# Patient Record
Sex: MALE | Race: WHITE | URBAN - METROPOLITAN AREA
[De-identification: names, ages, dates, MRNs, and addresses within clinical notes are randomized per-mention and may not be internally consistent; named-entity substitution may affect disease eponyms.]

---

## 2024-07-11 ENCOUNTER — APPOINTMENT (OUTPATIENT)
Dept: GENERAL RADIOLOGY | Age: 29
End: 2024-07-11

## 2024-07-11 ENCOUNTER — HOSPITAL ENCOUNTER (EMERGENCY)
Age: 29
Discharge: HOME | End: 2024-07-12
Attending: EMERGENCY MEDICINE

## 2024-07-11 VITALS
DIASTOLIC BLOOD PRESSURE: 81 MMHG | BODY MASS INDEX: 22.82 KG/M2 | OXYGEN SATURATION: 97 % | RESPIRATION RATE: 16 BRPM | SYSTOLIC BLOOD PRESSURE: 117 MMHG | WEIGHT: 163 LBS | HEART RATE: 68 BPM | TEMPERATURE: 97.5 F | HEIGHT: 71 IN

## 2024-07-11 DIAGNOSIS — S93.601A SPRAIN OF RIGHT FOOT, INITIAL ENCOUNTER: Primary | ICD-10-CM

## 2024-07-11 PROCEDURE — 73630 X-RAY EXAM OF FOOT: CPT

## 2024-07-11 PROCEDURE — 99283 EMERGENCY DEPT VISIT LOW MDM: CPT

## 2024-07-11 ASSESSMENT — PAIN - FUNCTIONAL ASSESSMENT: PAIN_FUNCTIONAL_ASSESSMENT: 0-10

## 2024-07-11 ASSESSMENT — PAIN SCALES - GENERAL: PAINLEVEL_OUTOF10: 2

## 2024-07-12 NOTE — DISCHARGE INSTRUCTIONS
Call today or tomorrow to follow up with your doctor in 2-3 days.    Use an ice pack or bag filled with ice and apply to the injured area 3 - 4 times a day for 15 - 20 minutes each time. Elevate the foot as much as possible.     Use ibuprofen or Tylenol (unless prescribed medications that have Tylenol in it) for pain.  You can take over the counter Ibuprofen (advil) tablets (4 every 8 hours or 3 every 6 hours or 2 every 4 hours)    Use your boot for the next several days until you are able to take 10 steps without pain.    Return to the Emergency Department for worsening of pain, increase swelling to the ankle, inability to move your toes, any other care or concern.

## 2024-07-12 NOTE — ED PROVIDER NOTES
[facility] Emergency Department  [address]  [facility] [address]  Phone: [phone]  Fax: [phone]      Pt Name: Miguelito Peterson  MRN:[id number]  Birthdate 1995  Date of evaluation: 7/11/2024      CHIEF COMPLAINT       Chief Complaint   Patient presents with    Foot Pain     Patient c/o right foot pain d/t right foot injury. Patient states his lawn tractors tire was coming off d/t someone not putting the pin back into the tire. Patient states he \"kicked the tire to put it back on so it would not fall off\" Patient states it hurts to walk on right foot at this time. Patient took 800mg of motrin.        HISTORY OF PRESENT ILLNESS   Miguelito Peterson is a 29 y.o. male who presents for evaluation of right foot pain.  The patient reports that around 5:45 PM tonight he kicked the tire of his lawnmower in frustration.  He states that he did not initially have much pain to the right foot but around 8:30 PM when he was walking around the grocery store he developed gradual onset, constant, progressive, sharp, stabbing, nonradiating pain to his right medial foot.  He takes 800 mg of ibuprofen with some improvement.  He denies any previous injury or surgery to the right foot.  The patient denies any other injury.  He denies fever, chills, headache, vision changes, neck pain, back pain, chest pain, shortness of breath, abdominal pain, urinary/bowel symptoms, nausea, vomiting, focal weakness, numbness, tingling, or recent illness.    REVIEW OF SYSTEMS     Positive: Right foot pain  Ten point review of systems was reviewed and is negative unless otherwise noted in the HPI    PAST MEDICAL HISTORY    has no past medical history on file.  The patient denies    SURGICAL HISTORY      has no past surgical history on file.  The patient denies    CURRENT MEDICATIONS       There are no discharge medications for this patient.      ALLERGIES     is allergic to penicillins.    FAMILY HISTORY     has no family status